# Patient Record
Sex: MALE | Race: WHITE | NOT HISPANIC OR LATINO | URBAN - METROPOLITAN AREA
[De-identification: names, ages, dates, MRNs, and addresses within clinical notes are randomized per-mention and may not be internally consistent; named-entity substitution may affect disease eponyms.]

---

## 2024-08-30 ENCOUNTER — OFFICE VISIT (OUTPATIENT)
Dept: URGENT CARE | Facility: CLINIC | Age: 29
End: 2024-08-30
Payer: COMMERCIAL

## 2024-08-30 VITALS
BODY MASS INDEX: 25.57 KG/M2 | TEMPERATURE: 97.9 F | HEIGHT: 70 IN | SYSTOLIC BLOOD PRESSURE: 116 MMHG | WEIGHT: 178.6 LBS | DIASTOLIC BLOOD PRESSURE: 73 MMHG | HEART RATE: 63 BPM | OXYGEN SATURATION: 100 % | RESPIRATION RATE: 18 BRPM

## 2024-08-30 DIAGNOSIS — L03.012 CELLULITIS OF FINGER OF LEFT HAND: Primary | ICD-10-CM

## 2024-08-30 PROCEDURE — G0382 LEV 3 HOSP TYPE B ED VISIT: HCPCS | Performed by: PHYSICIAN ASSISTANT

## 2024-08-30 PROCEDURE — S9083 URGENT CARE CENTER GLOBAL: HCPCS | Performed by: PHYSICIAN ASSISTANT

## 2024-08-30 RX ORDER — CEPHALEXIN 500 MG/1
500 CAPSULE ORAL EVERY 12 HOURS SCHEDULED
Qty: 14 CAPSULE | Refills: 0 | Status: SHIPPED | OUTPATIENT
Start: 2024-08-30 | End: 2024-09-06

## 2024-08-30 NOTE — PROGRESS NOTES
[facility]        NAME: Andrea Barone II is a 29 y.o. male  : 1995    MRN: [id number]  DATE: 2024  TIME: 12:19 PM    Assessment and Plan   Cellulitis of finger of left hand [L03.012]  1. Cellulitis of finger of left hand  cephalexin (KEFLEX) 500 mg capsule        X-ray deferred at this time as splinter was wooden.  States he did remove splinter himself.  Will have patient soak finger warm soapy water to have possible foreign body surface itself.  Antibiotic given.    Patient Instructions   Take antibiotic as prescribed.  Complete full dose of antibiotics even if symptoms begin to improve or resolve.  Over-the-counter Tylenol and ibuprofen as needed for pain and fever.  Observe for signs of worsening infection including increased swelling, redness, pain, discharge, fever or chills, or persistent symptoms.  Your symptoms should begin to improve over the next couple days.    Follow up with PCP in 3-5 days.  Proceed to  ER if symptoms worsen.    If tests have been performed at [facility], our office will contact you with results if changes need to be made to the care plan discussed with you at the visit.  You can review your full results on [facility] MyChart.    Chief Complaint     Chief Complaint   Patient presents with    Foreign Body in Skin     Splinter in left finger from last Friday          History of Present Illness       Patient is a 29-year-old male with no civic and past medical history presents the office complaining of possible splinter ventral aspect of middle phalanx of left finger for 1 week.  States he was moving things at work and felt something poke him.  States he removed a wooden splinter but unsure if removed entirely.  Over the last week, he began having swelling, pain, and erythema.  Denies fevers or chills.        Review of Systems   Review of Systems   Skin:  Positive for color change and wound.         Current Medications       Current Outpatient Medications:      "cephalexin (KEFLEX) 500 mg capsule, Take 1 capsule (500 mg total) by mouth every 12 (twelve) hours for 7 days, Disp: 14 capsule, Rfl: 0    Current Allergies     Allergies as of 08/30/2024    (No Known Allergies)            The following portions of the patient's history were reviewed and updated as appropriate: allergies, current medications, past family history, past medical history, past social history, past surgical history and problem list.     History reviewed. No pertinent past medical history.    History reviewed. No pertinent surgical history.    No family history on file.      Medications have been verified.        Objective   /73   Pulse 63   Temp 97.9 °F (36.6 °C) (Temporal)   Resp 18   Ht 5' 10\" (1.778 m)   Wt 81 kg (178 lb 9.6 oz)   SpO2 100%   BMI 25.63 kg/m²   No LMP for male patient.       Physical Exam     Physical Exam  Vitals and nursing note reviewed.   Constitutional:       Appearance: He is well-developed.   HENT:      Head: Normocephalic and atraumatic.      Right Ear: External ear normal.      Left Ear: External ear normal.      Nose: Nose normal.   Eyes:      General: Lids are normal.      Conjunctiva/sclera: Conjunctivae normal.   Skin:     General: Skin is warm and dry.      Capillary Refill: Capillary refill takes less than 2 seconds.      Comments: Left middle finger: Erythema, swelling, and small area of purulent drainage to middle phalanx ventral aspect.  Mild TTP to same.  Range of motion of digit intact.  No visible palpable foreign body.   Neurological:      Mental Status: He is alert.                   "

## 2025-02-11 ENCOUNTER — TELEPHONE (OUTPATIENT)
Age: 30
End: 2025-02-11

## 2025-02-11 ENCOUNTER — OFFICE VISIT (OUTPATIENT)
Dept: PODIATRY | Age: 30
End: 2025-02-11
Payer: COMMERCIAL

## 2025-02-11 VITALS — HEIGHT: 70 IN | BODY MASS INDEX: 27.35 KG/M2 | WEIGHT: 191 LBS

## 2025-02-11 DIAGNOSIS — B35.1 ONYCHOMYCOSIS: Primary | ICD-10-CM

## 2025-02-11 PROCEDURE — 99203 OFFICE O/P NEW LOW 30 MIN: CPT | Performed by: STUDENT IN AN ORGANIZED HEALTH CARE EDUCATION/TRAINING PROGRAM

## 2025-02-11 RX ORDER — CICLOPIROX 80 MG/ML
SOLUTION TOPICAL
Qty: 6.6 ML | Refills: 3 | Status: SHIPPED | OUTPATIENT
Start: 2025-02-11

## 2025-02-11 NOTE — PROGRESS NOTES
Name: Andrea Barone II      : 1995      MRN: [id number]  Encounter Provider: Kenyatta Moore DPM  Encounter Date: 2025   Encounter department: [facility] [place]  :  Assessment & Plan  Onychomycosis    Orders:    ciclopirox (PENLAC) 8 % solution; Apply topically daily at bedtime File nails in thickness weekly      PLAN:  I reviewed clinical exam with patient in detail today. I have discussed with the patient the pathophysiology of this diagnosis and reviewed how the examination correlates with this diagnosis.  PCP note from 25 reviewed    I discussed treatment options for toenail fungus with patient in great detail today. Patient has decided on topical penlac daily application (with weekly filing in thickness). We discussed that nails take anywhere from 6-12 months to fully grow out and thus she should continue to see improvement in distal nails as time progresses.   I discussed that even with sufficient topical treatment, cure rate is <15% and there is high chance of recurrent toenail fungal infection even if cure is achieved.   I discussed general foot hygiene such as keep feet clean and dry, wearing clean socks, washing socks in hot water + laundry , cleaning showers regularly, avoiding keeping shoes in dark/wark spots, using antifungal shoe spray, alternating shoes or replacing shoes all together.   Can trial brown/yellow listerine or apple cider vinegar cotton ball soaks  F/u in 6 months (Jublie vs terbinafine)     History of Present Illness   HPI  Andrea Barone II is a 29 y.o. male who presents to clinic for nail fungus. Notes toenails to right foot have become discolored and thickened over the past 1.5yrs. He is an avide hiker/backpacker, did wrestling in high school and does [employer] currently.        Review of Systems   Constitutional:  Negative for activity change, chills and fever.   HENT: Negative.     Respiratory:  Negative for cough, chest tightness  "and shortness of breath.    Cardiovascular:  Negative for chest pain and leg swelling.   Endocrine: Negative.    Genitourinary: Negative.    Neurological: Negative.  Negative for numbness.   Psychiatric/Behavioral: Negative.  Negative for agitation and behavioral problems.           Objective   Ht 5' 10\" (1.778 m)   Wt 86.6 kg (191 lb)   BMI 27.41 kg/m²      Physical Exam  Constitutional:       Appearance: Normal appearance.   Cardiovascular:      Pulses: Normal pulses.   Pulmonary:      Effort: Pulmonary effort is normal.   Skin:     Comments: Right toenails 3>4>5>1 onychomycosis with yellow discoloration, subungual debris and nail thickening.    Neurological:      General: No focal deficit present.      Mental Status: He is alert.           "

## 2025-02-11 NOTE — TELEPHONE ENCOUNTER
PA for ciclopirox (PENLAC) 8 % solution SUBMITTED to [employer]    via    []CMM-KEY:   [x]Surescripts-Case ID #   []Availity-Auth ID # NDC #   []Faxed to plan   []Other website   []Phone call Case ID #     [x]PA sent as URGENT    All office notes, labs and other pertaining documents and studies sent. Clinical questions answered. Awaiting determination from insurance company.     Turnaround time for your insurance to make a decision on your Prior Authorization can take 7-21 business days.

## 2025-03-10 ENCOUNTER — TELEPHONE (OUTPATIENT)
Dept: PODIATRY | Age: 30
End: 2025-03-10

## 2025-03-10 NOTE — TELEPHONE ENCOUNTER
Per Dr. Moore called the patient to make him aware that his insurance will not cover the CICLOPIROX 8% solution. Asked pt If he was interested in using the oral medication,  terbinafine and the pt said that he had already discussed with Dr. Moore and is not interested in the oral medication because of the risk to his liver function. He is asking if there is another alternative or if he can appeal the decision of CICLOPIROX.     Please advise      Will also call [facility] to ask how much the prescription is without insurance.    None

## 2025-03-11 NOTE — TELEPHONE ENCOUNTER
Spoke to Pharmacist at [facility] the PENLAC is $20.22. Pt is aware and will  the prescription.